# Patient Record
Sex: MALE | Employment: FULL TIME | URBAN - NONMETROPOLITAN AREA
[De-identification: names, ages, dates, MRNs, and addresses within clinical notes are randomized per-mention and may not be internally consistent; named-entity substitution may affect disease eponyms.]

---

## 2017-04-21 ENCOUNTER — OFFICE VISIT (OUTPATIENT)
Dept: MEDICAL GROUP | Facility: PHYSICIAN GROUP | Age: 47
End: 2017-04-21
Payer: COMMERCIAL

## 2017-04-21 VITALS
HEIGHT: 68 IN | WEIGHT: 263 LBS | BODY MASS INDEX: 39.86 KG/M2 | HEART RATE: 111 BPM | SYSTOLIC BLOOD PRESSURE: 152 MMHG | DIASTOLIC BLOOD PRESSURE: 102 MMHG | OXYGEN SATURATION: 93 % | TEMPERATURE: 98.2 F | RESPIRATION RATE: 16 BRPM

## 2017-04-21 DIAGNOSIS — J30.2 SEASONAL ALLERGIC RHINITIS, UNSPECIFIED ALLERGIC RHINITIS TRIGGER: ICD-10-CM

## 2017-04-21 DIAGNOSIS — Z23 NEED FOR TDAP VACCINATION: ICD-10-CM

## 2017-04-21 DIAGNOSIS — Z13.6 SCREENING FOR CARDIOVASCULAR CONDITION: ICD-10-CM

## 2017-04-21 DIAGNOSIS — E66.9 OBESITY (BMI 35.0-39.9 WITHOUT COMORBIDITY): ICD-10-CM

## 2017-04-21 DIAGNOSIS — R09.81 NASAL CONGESTION: ICD-10-CM

## 2017-04-21 DIAGNOSIS — I10 ESSENTIAL HYPERTENSION: ICD-10-CM

## 2017-04-21 DIAGNOSIS — Z00.00 HEALTH CARE MAINTENANCE: ICD-10-CM

## 2017-04-21 PROCEDURE — 90471 IMMUNIZATION ADMIN: CPT | Performed by: NURSE PRACTITIONER

## 2017-04-21 PROCEDURE — 99214 OFFICE O/P EST MOD 30 MIN: CPT | Mod: 25 | Performed by: NURSE PRACTITIONER

## 2017-04-21 PROCEDURE — 90715 TDAP VACCINE 7 YRS/> IM: CPT | Performed by: NURSE PRACTITIONER

## 2017-04-21 RX ORDER — LOSARTAN POTASSIUM AND HYDROCHLOROTHIAZIDE 12.5; 5 MG/1; MG/1
1 TABLET ORAL DAILY
Qty: 90 TAB | Refills: 1 | Status: SHIPPED | OUTPATIENT
Start: 2017-04-21 | End: 2018-04-23 | Stop reason: SDUPTHER

## 2017-04-21 RX ORDER — CETIRIZINE HYDROCHLORIDE 10 MG/1
10 TABLET ORAL DAILY
Qty: 60 TAB | Refills: 5 | Status: SHIPPED | OUTPATIENT
Start: 2017-04-21

## 2017-04-21 RX ORDER — FLUTICASONE PROPIONATE 50 MCG
1 SPRAY, SUSPENSION (ML) NASAL 2 TIMES DAILY
Qty: 1 BOTTLE | Refills: 11 | Status: SHIPPED | OUTPATIENT
Start: 2017-04-21

## 2017-04-21 ASSESSMENT — PATIENT HEALTH QUESTIONNAIRE - PHQ9: CLINICAL INTERPRETATION OF PHQ2 SCORE: 0

## 2017-04-21 NOTE — PROGRESS NOTES
Chief Complaint   Patient presents with   • Establish Care     cough, congestion, discuss BP         This is a 46 y.o.male patient that presents today with the following: Establish care with new PCP, acute and chronic conditions, medication refills    Essential hypertension  Pt reports that this is a chronic condition and is currently not treated as he is out of medication. He was seen in August 2016 in the urgent care and started on losartan 50 mg daily. He ran out and has not taken it since September 2016. His blood pressure today is 152/102. He does deny symptoms of hypertension. When he took the losartan he did tolerate the medication well with no significant or bothersome side effects. He is due for labs, these have been ordered. I would like him to start taking new blood pressure medication, will start-hydrochlorothiazide, and then have labs done 2 weeks after starting new medication. I did discuss with him the risks, benefits and side effects of medication. He is also to work on his weight by eating a healthier diet and increasing his physical activity.    Nasal congestion  Pt states he has had nasal congestion for about 2 months. Reports increase mucus, especially in the morning. Has a little, productive at times--mucus is light green. He sneezes a lot. He feel a lot of ear pressure. He has a lot of sinus pressure and pain and headache. Denies fever.  Upon examination, patient's lateral TMs are mildly retracted with air-fluid levels in the left TM. His nasal mucosa bilaterally were edematous and erythematous with cobblestoning and oropharynx. Discussed with him that his symptoms are consistent with allergic rhinitis and I would like him to start a regimen including second-generation antihistamine, Zyrtec, 10 mg 1-2 pills daily starting with one pill daily increasing to 2 pills after 1-2 weeks. He is also to use Flonase, one spray to each nostril twice a day as well as nasal saline spray 2 sprays to each  nostril up to 3-4 times a day for congestion and nasal rinsing. He may also consider using a H2 blocker such as Pepcid or Zantac for additional histamine blocking. He is to follow with me in 3 months for follow-up.     Obesity (BMI 35.0-39.9 without comorbidity) (HCC)  This is a chronic condition, uncontrolled. Patient's weight is 263, BMI 39.99. He does understand the effects his obesity have on his health. He understands that he needs to increase his physical activity and decrease his calories in order to lose weight. He will work on this and we will reassess this at his follow-up appointment in July.    Health care maintenance  Patient is due for health maintenance exams including routine fasting labs. These have been ordered. He denies a family history of colon cancer in a first-degree relative, thus he will not need colonoscopy until 50. He also denies a family history of prostate cancer in a first-degree relative, thus he does not need PSA screening. He does need TDaP, this was done today.    Seasonal allergic rhinitis  See additional notes      No visits with results within 1 Month(s) from this visit.  Latest known visit with results is:    Hospital Outpatient Visit on 08/02/2016   Component Date Value   • WBC 08/02/2016 6.1    • RBC 08/02/2016 5.45    • Hemoglobin 08/02/2016 17.0    • Hematocrit 08/02/2016 49.8    • MCV 08/02/2016 91.4    • MCH 08/02/2016 31.2    • MCHC 08/02/2016 34.1    • RDW 08/02/2016 40.3    • Platelet Count 08/02/2016 184    • MPV 08/02/2016 10.9    • Neutrophils-Polys 08/02/2016 68.80    • Lymphocytes 08/02/2016 21.60*   • Monocytes 08/02/2016 7.20    • Eosinophils 08/02/2016 1.50    • Basophils 08/02/2016 0.70    • Immature Granulocytes 08/02/2016 0.20    • Nucleated RBC 08/02/2016 0.00    • Neutrophils (Absolute) 08/02/2016 4.18    • Lymphs (Absolute) 08/02/2016 1.31    • Monos (Absolute) 08/02/2016 0.44    • Eos (Absolute) 08/02/2016 0.09    • Baso (Absolute) 08/02/2016 0.04    •  "Immature Granulocytes (a* 08/02/2016 0.01    • NRBC (Absolute) 08/02/2016 0.00    • Sodium 08/02/2016 139    • Potassium 08/02/2016 4.4    • Chloride 08/02/2016 107    • Co2 08/02/2016 26    • Anion Gap 08/02/2016 6.0    • Glucose 08/02/2016 110*   • Bun 08/02/2016 17    • Creatinine 08/02/2016 0.93    • Calcium 08/02/2016 9.7    • AST(SGOT) 08/02/2016 18    • ALT(SGPT) 08/02/2016 38    • Alkaline Phosphatase 08/02/2016 48    • Total Bilirubin 08/02/2016 0.6    • Albumin 08/02/2016 4.2    • Total Protein 08/02/2016 7.1    • Globulin 08/02/2016 2.9    • A-G Ratio 08/02/2016 1.4    • GFR If  08/02/2016 >60    • GFR If Non  Ameri* 08/02/2016 >60          clinical course has been stable    Past Medical History   Diagnosis Date   • Hypertension        Past Surgical History   Procedure Laterality Date   • Other       none reported       Family History   Problem Relation Age of Onset   • Diabetes Mother    • Hypertension Mother    • Cancer Maternal Grandmother    • Other Father      \"flesh-eating\" infection       Review of patient's allergies indicates no known allergies.    Current Outpatient Prescriptions Ordered in [facility]   Medication Sig Dispense Refill   • losartan-hydrochlorothiazide (HYZAAR) 50-12.5 MG per tablet Take 1 Tab by mouth every day. 90 Tab 1   • cetirizine (ZYRTEC) 10 MG Tab Take 1 Tab by mouth every day. 60 Tab 5   • fluticasone (FLONASE) 50 MCG/ACT nasal spray Spray 1 Spray in nose 2 times a day. 1 Bottle 11     No current [facility]-ordered facility-administered medications on file.       Constitutional ROS: No unexpected change in weight, No weakness, No unexplained fevers, sweats, or chills  Neck ROS: No lumps or masses, No swollen glands, No significant pain in neck  Pulmonary ROS: Positive per history of present illness  Cardiovascular ROS: No chest pain, No edema, No palpitations, Positive for hypertension, poorly controlled, per history of present illness  Gastrointestinal ROS: No " "abdominal pain, No nausea, vomiting, diarrhea, or constipation, no blood in stool  Musculoskeletal/Extremities ROS: No clubbing, No cyanosis, No pain, redness or swelling on the joints  Neurologic ROS: Normal development, No seizures, No weakness  All other systems reviewed and are within normal limits    Physical exam:  /102 mmHg  Pulse 111  Temp(Src) 36.8 °C (98.2 °F)  Resp 16  Ht 1.727 m (5' 8\")  Wt 119.296 kg (263 lb)  BMI 40.00 kg/m2  SpO2 93%  General Appearance: Young male, alert, no distress, obese, well-groomed  Skin: Skin color, texture, turgor normal. No rashes or lesions.  Eyes: conjunctivae/corneas clear. PERRL, EOM's intact.   Ears: External ears normal. Canals clear. positive findings: R TM - retracted, L TM - retracted with air-fluid levels  Nose/Sinuses: positive findings: mucosa erythematous and swollen  Oropharynx: positive findings: mild oropharyngeal erythema, cobblestoning  Lungs: negative findings: normal respiratory rate and rhythm, lungs clear to auscultation  Heart: negative. RRR without murmur, gallop, or rubs.  No ectopy.  Abdomen: Abdomen soft, non-tender. BS normal. No masses,  No organomegaly  Musculoskeletal: negative  Neurologic: intact, oriented, mood appropriate, judgment intact. Cranial nerves II through XII grossly intact    Medical decision making/discussion: Patient here to establish care with new PCP, discuss acute and chronic conditions and medication refills. He was started on losartan-hydrochlorothiazide 50-12.5 milligrams daily for his hypertension. He is to take this for 2 weeks then have lab work done. For allergies, I will have him take Zyrtec, 10 mg daily. He can increase to 2 pills daily after 1-2 weeks if needed. He is also to start Flonase, one spray to each nostril twice a day. I would also like him to take nasal saline, 2 sprays to each nostril up to 3-4 times a day for congestion and nasal rinsing. He can also consider Pepcid or Zantac for additional " H2 blocking. He will get TD Today. He is to work on his weight by eating healthy diet and increasing physical activity.    Aj was seen today for establish care.    Diagnoses and all orders for this visit:    Essential hypertension  -     losartan-hydrochlorothiazide (HYZAAR) 50-12.5 MG per tablet; Take 1 Tab by mouth every day.    Seasonal allergic rhinitis, unspecified allergic rhinitis trigger  -     cetirizine (ZYRTEC) 10 MG Tab; Take 1 Tab by mouth every day.  -     fluticasone (FLONASE) 50 MCG/ACT nasal spray; Spray 1 Spray in nose 2 times a day.    Nasal congestion    Obesity (BMI 35.0-39.9 without comorbidity) ([facility])  -     Patient identified as having weight management issue.  Appropriate orders and counseling given.    Health care maintenance    Need for Tdap vaccination  -     TDAP VACCINE =>6YO IM    Screening for cardiovascular condition  -     COMP METABOLIC PANEL; Future  -     LIPID PROFILE; Future          Please note that this dictation was created using voice recognition software. I have made every reasonable attempt to correct obvious errors, but I expect that there are errors of grammar and possibly content that I did not discover before finalizing the note.

## 2017-04-21 NOTE — MR AVS SNAPSHOT
"        Aj Carlisle   2017 2:20 PM   Office Visit   MRN: [id number]    Department:  [facility]   Dept Phone:  [phone]    Description:  Male : 1970   Provider:  MARVA Rendon           Reason for Visit     Establish Care cough, congestion, discuss BP      Allergies as of 2017     No Known Allergies      You were diagnosed with     Essential hypertension   [1140689]       Health care maintenance   [755114]       Need for Tdap vaccination   [914894]       Screening for cardiovascular condition   [906387]       Nasal congestion   [570383]       Obesity (BMI 35.0-39.9 without comorbidity) ([facility])   [006182]       Seasonal allergic rhinitis, unspecified allergic rhinitis trigger   [7351734]         Vital Signs     Blood Pressure Pulse Temperature Respirations Height Weight    152/102 mmHg 111 36.8 °C (98.2 °F) 16 1.727 m (5' 8\") 119.296 kg (263 lb)    Body Mass Index Oxygen Saturation Smoking Status             40.00 kg/m2 93% Never Smoker          Basic Information     Date Of Birth Sex Race Ethnicity Preferred Language    1970 Male Unable to Obtain Unknown English      Problem List              ICD-10-CM Priority Class Noted - Resolved    Essential hypertension I10   2017 - Present    Health care maintenance Z00.00   2017 - Present    Nasal congestion R09.81   2017 - Present    Obesity (BMI 35.0-39.9 without comorbidity) ([facility]) E66.9   2017 - Present      Health Maintenance        Date Due Completion Dates    IMM DTaP/Tdap/Td Vaccine (1 - Tdap) 1989 ---            Current Immunizations     Tdap Vaccine  Incomplete      Below and/or attached are the medications your provider expects you to take. Review all of your home medications and newly ordered medications with your provider and/or pharmacist. Follow medication instructions as directed by your provider and/or pharmacist. Please keep your medication list with you and share with your provider. Update " the information when medications are discontinued, doses are changed, or new medications (including over-the-counter products) are added; and carry medication information at all times in the event of emergency situations     Allergies:  No Known Allergies          Medications  Valid as of: April 21, 2017 -  2:40 PM    Generic Name Brand Name Tablet Size Instructions for use    Cetirizine HCl (Tab) ZYRTEC 10 MG Take 1 Tab by mouth every day.        Fluticasone Propionate (Suspension) FLONASE 50 MCG/ACT Spray 1 Spray in nose 2 times a day.        Losartan Potassium-HCTZ (Tab) HYZAAR 50-12.5 MG Take 1 Tab by mouth every day.        .                 Medicines prescribed today were sent to:     [facility] [address] [place] [address] - [address] [facility]    [address] [facility] [address]    Phone: [phone] Fax: [phone]    Open 24 Hours?: No      Medication refill instructions:       If your prescription bottle indicates you have medication refills left, it is not necessary to call your provider’s office. Please contact your pharmacy and they will refill your medication.    If your prescription bottle indicates you do not have any refills left, you may request refills at any time through one of the following ways: The online [employer] system (except Urgent Care), by calling your provider’s office, or by asking your pharmacy to contact your provider’s office with a refill request. Medication refills are processed only during regular business hours and may not be available until the next business day. Your provider may request additional information or to have a follow-up visit with you prior to refilling your medication.   *Please Note: Medication refills are assigned a new Rx number when refilled electronically. Your pharmacy may indicate that no refills were authorized even though a new prescription for the same medication is available at the pharmacy. Please request the medicine by name with the  pharmacy before contacting your provider for a refill.        Your To Do List     Future Labs/Procedures Complete By Expires    COMP METABOLIC PANEL  As directed 4/21/2018    LIPID PROFILE  As directed 4/21/2018      Instructions    Start new BP medication--take for at least 2 weeks before having your labs done.    Labs are fasting--nothing to eat or drink from 10 the night before    Allergy regimen: Zyrtec 10 mg 1-2 pills daily, start with 1 daily, then can go to 2 pills daily after 1-2 weeks. Flonase 1 spray to each nostril twice a day. Nasal saline 2 sprays to each nostril 3-4 times a days.    Follow up with me in 3 months                Dittit Access Code: [email or web address]  Expires: 5/21/2017  2:04 PM    Dittit  A secure, online tool to manage your health information     [employer] Dittit® is a secure, online tool that connects you to your personalized health information from the privacy of your home -- day or night - making it very easy for you to manage your healthcare. Once the activation process is completed, you can even access your medical information using the Dittit skip, which is available for free in the [employer] Skip store or Google Play store.     Dittit provides the following levels of access (as shown below):   My Chart Features   [facility] Doctor [facility]  Specialists [facility]  Urgent  Care Non-[facility]  Primary Care  Doctor   Email your healthcare team securely and privately 24/7 X X X    Manage appointments: schedule your next appointment; view details of past/upcoming appointments X      Request prescription refills. X      View recent personal medical records, including lab and immunizations X X X X   View health record, including health history, allergies, medications X X X X   Read reports about your outpatient visits, procedures, consult and ER notes X X X X   See your discharge summary, which is a recap of your hospital and/or ER visit that includes your diagnosis, lab results,  and care plan. X X       How to register for [employer]:  1. Go to  [email or web address].  2. Click on the Sign Up Now box, which takes you to the New Member Sign Up page. You will need to provide the following information:  a. Enter your [employer] Access Code exactly as it appears at the top of this page. (You will not need to use this code after you’ve completed the sign-up process. If you do not sign up before the expiration date, you must request a new code.)   b. Enter your date of birth.   c. Enter your home email address.   d. Click Submit, and follow the next screen’s instructions.  3. Create a [employer] ID. This will be your [employer] login ID and cannot be changed, so think of one that is secure and easy to remember.  4. Create a [employer] password. You can change your password at any time.  5. Enter your Password Reset Question and Answer. This can be used at a later time if you forget your password.   6. Enter your e-mail address. This allows you to receive e-mail notifications when new information is available in [employer].  7. Click Sign Up. You can now view your health information.    For assistance activating your [employer] account, call [phone]

## 2017-04-21 NOTE — ASSESSMENT & PLAN NOTE
Pt states he has had nasal congestion for about 2 months. Reports increase mucus, especially in the morning. Has a little, productive at times--mucus is light green. He sneezes a lot. He feel a lot of ear pressure. He has a lot of sinus pressure and pain and headache. Denies fever.  Upon examination, patient's lateral TMs are mildly retracted with air-fluid levels in the left TM. His nasal mucosa bilaterally were edematous and erythematous with cobblestoning and oropharynx. Discussed with him that his symptoms are consistent with allergic rhinitis and I would like him to start a regimen including second-generation antihistamine, Zyrtec, 10 mg 1-2 pills daily starting with one pill daily increasing to 2 pills after 1-2 weeks. He is also to use Flonase, one spray to each nostril twice a day as well as nasal saline spray 2 sprays to each nostril up to 3-4 times a day for congestion and nasal rinsing. He may also consider using a H2 blocker such as Pepcid or Zantac for additional histamine blocking. He is to follow with me in 3 months for follow-up.

## 2017-04-21 NOTE — ASSESSMENT & PLAN NOTE
This is a chronic condition, uncontrolled. Patient's weight is 263, BMI 39.99. He does understand the effects his obesity have on his health. He understands that he needs to increase his physical activity and decrease his calories in order to lose weight. He will work on this and we will reassess this at his follow-up appointment in July.

## 2017-04-21 NOTE — PATIENT INSTRUCTIONS
Start new BP medication--take for at least 2 weeks before having your labs done.    Labs are fasting--nothing to eat or drink from 10 the night before    Allergy regimen: Zyrtec 10 mg 1-2 pills daily, start with 1 daily, then can go to 2 pills daily after 1-2 weeks. Flonase 1 spray to each nostril twice a day. Nasal saline 2 sprays to each nostril 3-4 times a days.    Follow up with me in 3 months

## 2017-04-21 NOTE — ASSESSMENT & PLAN NOTE
Patient is due for health maintenance exams including routine fasting labs. These have been ordered. He denies a family history of colon cancer in a first-degree relative, thus he will not need colonoscopy until 50. He also denies a family history of prostate cancer in a first-degree relative, thus he does not need PSA screening. He does need TDaP, this was done today.

## 2017-04-21 NOTE — ASSESSMENT & PLAN NOTE
Pt reports that this is a chronic condition and is currently not treated as he is out of medication. He was seen in August 2016 in the urgent care and started on losartan 50 mg daily. He ran out and has not taken it since September 2016. His blood pressure today is 152/102. He does deny symptoms of hypertension. When he took the losartan he did tolerate the medication well with no significant or bothersome side effects. He is due for labs, these have been ordered. I would like him to start taking new blood pressure medication, will start-hydrochlorothiazide, and then have labs done 2 weeks after starting new medication. I did discuss with him the risks, benefits and side effects of medication. He is also to work on his weight by eating a healthier diet and increasing his physical activity.

## 2017-04-27 ENCOUNTER — OFFICE VISIT (OUTPATIENT)
Dept: URGENT CARE | Facility: PHYSICIAN GROUP | Age: 47
End: 2017-04-27
Payer: COMMERCIAL

## 2017-04-27 VITALS
HEART RATE: 95 BPM | WEIGHT: 250 LBS | BODY MASS INDEX: 38.02 KG/M2 | SYSTOLIC BLOOD PRESSURE: 130 MMHG | TEMPERATURE: 97.7 F | DIASTOLIC BLOOD PRESSURE: 88 MMHG | RESPIRATION RATE: 16 BRPM | OXYGEN SATURATION: 95 %

## 2017-04-27 DIAGNOSIS — H10.023 PINK EYE, BILATERAL: ICD-10-CM

## 2017-04-27 PROCEDURE — 99214 OFFICE O/P EST MOD 30 MIN: CPT | Performed by: FAMILY MEDICINE

## 2017-04-27 RX ORDER — SULFACETAMIDE SODIUM 100 MG/ML
1 SOLUTION/ DROPS OPHTHALMIC
Qty: 1 BOTTLE | Refills: 0 | Status: SHIPPED | OUTPATIENT
Start: 2017-04-27

## 2017-04-27 NOTE — MR AVS SNAPSHOT
Aj Carlisle   2017 6:15 PM   Office Visit   MRN: [id number]    Department:  [facility]   Dept Phone:  [phone]    Description:  Male : 1970   Provider:  Clarke Carpenter M.D.           Reason for Visit     Red Eye both eyes with redness/drainage x2 days      Allergies as of 2017     No Known Allergies      You were diagnosed with     Pink eye, bilateral   [[id number]]         Vital Signs     Blood Pressure Pulse Temperature Respirations Weight Oxygen Saturation    130/88 mmHg 95 36.5 °C (97.7 °F) 16 113.399 kg (250 lb) 95%    Smoking Status                   Never Smoker            Basic Information     Date Of Birth Sex Race Ethnicity Preferred Language    1970 Male Unable to Obtain Unknown English      Your appointments     May 06, 2017  7:30 AM   Adult Draw/Collection with [facility] (--)    [address] [place] [address] [place] [address]   [phone]            2017  3:40 PM   Established Patient with MARVA Rendon   [facility] [place] ([place])    [address] [place] [address]  [place] [id number] [phone]           You will be receiving a confirmation call a few days before your appointment from our automated call confirmation system.              Problem List              ICD-10-CM Priority Class Noted - Resolved    Essential hypertension I10   2017 - Present    Health care maintenance Z00.00   2017 - Present    Nasal congestion R09.81   2017 - Present    Obesity (BMI 35.0-39.9 without comorbidity) ([facility]) E66.9   2017 - Present    Seasonal allergic rhinitis J30.2   2017 - Present      Health Maintenance        Date Due Completion Dates    IMM DTaP/Tdap/Td Vaccine (2 - Td) 2027            Current Immunizations     Tdap Vaccine 2017      Below and/or attached are the medications your provider expects you to take. Review all of your home medications and newly ordered medications with  your provider and/or pharmacist. Follow medication instructions as directed by your provider and/or pharmacist. Please keep your medication list with you and share with your provider. Update the information when medications are discontinued, doses are changed, or new medications (including over-the-counter products) are added; and carry medication information at all times in the event of emergency situations     Allergies:  No Known Allergies          Medications  Valid as of: April 27, 2017 -  6:55 PM    Generic Name Brand Name Tablet Size Instructions for use    Cetirizine HCl (Tab) ZYRTEC 10 MG Take 1 Tab by mouth every day.        Fluticasone Propionate (Suspension) FLONASE 50 MCG/ACT Spray 1 Spray in nose 2 times a day.        Losartan Potassium-HCTZ (Tab) HYZAAR 50-12.5 MG Take 1 Tab by mouth every day.        Sulfacetamide Sodium (Solution) SULAMYD 10 % Place 1 Drop in both eyes every 3 hours.        .                 Medicines prescribed today were sent to:     [facility] [address] [place] [address] [address] [address]    Phone: [phone] Fax: [phone]    Open 24 Hours?: No    [facility] [address] [place] [address] [address] [facility]    [address] [facility] [address]    Phone: [phone] Fax: [phone]    Open 24 Hours?: No      Medication refill instructions:       If your prescription bottle indicates you have medication refills left, it is not necessary to call your provider’s office. Please contact your pharmacy and they will refill your medication.    If your prescription bottle indicates you do not have any refills left, you may request refills at any time through one of the following ways: The online [employer] system (except Urgent Care), by calling your provider’s office, or by asking your pharmacy to contact your provider’s office with a refill request. Medication refills are processed only during regular business hours and may  not be available until the next business day. Your provider may request additional information or to have a follow-up visit with you prior to refilling your medication.   *Please Note: Medication refills are assigned a new Rx number when refilled electronically. Your pharmacy may indicate that no refills were authorized even though a new prescription for the same medication is available at the pharmacy. Please request the medicine by name with the pharmacy before contacting your provider for a refill.           mAPPn Access Code: [email or web address]  Expires: 5/21/2017  2:04 PM    mAPPn  A secure, online tool to manage your health information     [employer] mAPPn® is a secure, online tool that connects you to your personalized health information from the privacy of your home -- day or night - making it very easy for you to manage your healthcare. Once the activation process is completed, you can even access your medical information using the mAPPn skip, which is available for free in the Apple Skip store or Google Play store.     mAPPn provides the following levels of access (as shown below):   My Chart Features   [facility]  Specialists [facility]  Urgent  Care [facility]  Primary Care  Doctor   Email your healthcare team securely and privately 24/7 X X X    Manage appointments: schedule your next appointment; view details of past/upcoming appointments X      Request prescription refills. X      View recent personal medical records, including lab and immunizations X X X X   View health record, including health history, allergies, medications X X X X   Read reports about your outpatient visits, procedures, consult and ER notes X X X X   See your discharge summary, which is a recap of your hospital and/or ER visit that includes your diagnosis, lab results, and care plan. X X       How to register for mAPPn:  1. Go to  [email or web address].  2. Click on the Sign Up Now box, which  takes you to the New Member Sign Up page. You will need to provide the following information:  a. Enter your [employer] Access Code exactly as it appears at the top of this page. (You will not need to use this code after you’ve completed the sign-up process. If you do not sign up before the expiration date, you must request a new code.)   b. Enter your date of birth.   c. Enter your home email address.   d. Click Submit, and follow the next screen’s instructions.  3. Create a [employer] ID. This will be your [employer] login ID and cannot be changed, so think of one that is secure and easy to remember.  4. Create a [employer] password. You can change your password at any time.  5. Enter your Password Reset Question and Answer. This can be used at a later time if you forget your password.   6. Enter your e-mail address. This allows you to receive e-mail notifications when new information is available in [employer].  7. Click Sign Up. You can now view your health information.    For assistance activating your [employer] account, call [phone]

## 2017-04-28 NOTE — PROGRESS NOTES
"    Chief Complaint   Patient presents with   • Red Eye     both eyes with redness/drainage x2 days           CC:  here for \"pink eye\"      Patient comes in complaining of bilateral eye discharge for 2 d.   C/o white to yellow discharge from the eye.   reports no eye pain, just some itchiness as well as irritation.  visual acuity is unchanged.   has no concurrent fever, chills, cough or upper airway congestion. No sinus pain or pressure.   has not tried anything for this. Nothing seems to make it better or worse.          Social History   Substance Use Topics   • Smoking status: Never Smoker    • Smokeless tobacco: Never Used   • Alcohol Use: 0.0 oz/week     0 Standard drinks or equivalent per week      Comment: once a week when bowling         Social - currently in school.   Questionable sick contacts    Current Outpatient Prescriptions on File Prior to Visit   Medication Sig Dispense Refill   • losartan-hydrochlorothiazide (HYZAAR) 50-12.5 MG per tablet Take 1 Tab by mouth every day. 90 Tab 1   • cetirizine (ZYRTEC) 10 MG Tab Take 1 Tab by mouth every day. 60 Tab 5   • fluticasone (FLONASE) 50 MCG/ACT nasal spray Spray 1 Spray in nose 2 times a day. 1 Bottle 11     No current facility-administered medications on file prior to visit.           Past Medical History   Diagnosis Date   • Hypertension              ROS  Denies cough, chills or abdominal pain or dysuria.    Objective:    Blood pressure 130/88, pulse 95, temperature 36.5 °C (97.7 °F), resp. rate 16, weight 113.399 kg (250 lb), SpO2 95 %.    EXAM      HEENT - PERRLA, EOMI.  There is bilateral conjunctival injection and discharge.  No posterior pharyngeal erythema or exudates  No oral cavity lesions  Ears - TMs both clear.     Neuro - alert and oriented x3. CN 2-12 grossly intact.  Lungs - CTA. No wheezes, rhonchi or rales.  Heart - regular rate and rhythm without murmur.  Musculoskeletal - No lower extremity edema noted.     Psych - behavior " normal    assessment & plan    1. Bacterial conjunctivitis     - sulfacetamide (SULAMYD) 10 % Solution; Place 1 Drop in both eyes every 3 hours.  Dispense: 1 Bottle; Refill: 0

## 2017-11-28 ENCOUNTER — APPOINTMENT (OUTPATIENT)
Dept: RADIOLOGY | Facility: IMAGING CENTER | Age: 47
End: 2017-11-28
Attending: EMERGENCY MEDICINE
Payer: COMMERCIAL

## 2017-11-28 ENCOUNTER — OCCUPATIONAL MEDICINE (OUTPATIENT)
Dept: URGENT CARE | Facility: CLINIC | Age: 47
End: 2017-11-28
Payer: COMMERCIAL

## 2017-11-28 VITALS
OXYGEN SATURATION: 96 % | HEART RATE: 78 BPM | DIASTOLIC BLOOD PRESSURE: 84 MMHG | RESPIRATION RATE: 14 BRPM | SYSTOLIC BLOOD PRESSURE: 132 MMHG | TEMPERATURE: 97.8 F

## 2017-11-28 DIAGNOSIS — S69.91XA HAND INJURY, RIGHT, INITIAL ENCOUNTER: ICD-10-CM

## 2017-11-28 DIAGNOSIS — Z02.1 PRE-EMPLOYMENT DRUG SCREENING: ICD-10-CM

## 2017-11-28 DIAGNOSIS — S63.641A SPRAIN OF METACARPOPHALANGEAL (MCP) JOINT OF RIGHT THUMB, INITIAL ENCOUNTER: ICD-10-CM

## 2017-11-28 LAB
AMP AMPHETAMINE: NORMAL
BREATH ALCOHOL COMMENT: NORMAL
COC COCAINE: NORMAL
INT CON NEG: NORMAL
INT CON POS: NORMAL
MET METHAMPHETAMINES: NORMAL
OPI OPIATES: NORMAL
PCP PHENCYCLIDINE: NORMAL
POC BREATHALIZER: 0 PERCENT (ref 0–0.01)
POC DRUG COMMENT 753798-OCCUPATIONAL HEALTH: NEGATIVE
THC: NORMAL

## 2017-11-28 PROCEDURE — 80305 DRUG TEST PRSMV DIR OPT OBS: CPT | Performed by: EMERGENCY MEDICINE

## 2017-11-28 PROCEDURE — 82075 ASSAY OF BREATH ETHANOL: CPT | Performed by: EMERGENCY MEDICINE

## 2017-11-28 PROCEDURE — 73130 X-RAY EXAM OF HAND: CPT | Mod: TC,RT | Performed by: EMERGENCY MEDICINE

## 2017-11-28 PROCEDURE — 99202 OFFICE O/P NEW SF 15 MIN: CPT | Performed by: EMERGENCY MEDICINE

## 2017-11-28 ASSESSMENT — ENCOUNTER SYMPTOMS
TINGLING: 0
FOCAL WEAKNESS: 0
SENSORY CHANGE: 0

## 2017-11-28 NOTE — LETTER
EMPLOYEE’S CLAIM FOR COMPENSATION/ REPORT OF INITIAL TREATMENT  FORM C-4    EMPLOYEE’S CLAIM - PROVIDE ALL INFORMATION REQUESTED   First Name  Aj Last Name  Orestes Birthdate                    1970                Sex  male Claim Number   Home Address  [address] Age  47 y.o. 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 (if applicable)  Ice finger     How did this injury or occupational disease occur? (Be specific an answer in detail. Use additional sheet if necessary)  Duct kaitlyn telescope slid down smashing right thumb    If you believe that you have an occupational disease, when did you first have knowledge of the disability and it relationship to your employment?  N/A  Witnesses to the Accident  my crew       Nature of Injury or Occupational Disease  Defer  Part(s) of Body Injured or Affected  Thumb (R), Hand (R), Defer    I certify that the above is true and correct to the best of my knowledge and that I have provided this information in order to obtain the benefits of [place] or  [place]).  I hereby authorize any physician, chiropractor, surgeon, practitioner, or other person, any hospital, including [facility] or [facility] hospital, any medical service organization, any insurance company, or other institution or organization to release to each other, any medical or other information, including benefits paid or payable, pertinent to this injury or disease, except information relative to diagnosis, treatment and/or counseling for AIDS, psychological conditions, alcohol or controlled substances, for which I must give specific authorization.  A Photostat of this authorization shall be as valid as the original.     Date   Place   Employee’s Signature   THIS REPORT MUST BE COMPLETED AND MAILED WITHIN 3 WORKING DAYS OF TREATMENT   Place  [facility]  Name of Facility  [facility]   Date  11/28/2017 Diagnosis(S63.641A) Sprain of metacarpophalangeal (MCP) joint of right thumb, initial encounter  (S69.91XA) Hand injury, right, initial encounter   Is there evidence the injured employee was under the influence of alcohol and/or another controlled substance at the time of accident?   Hour  9:29 AM Description of Injury or Disease  Diagnoses of Sprain of metacarpophalangeal (MCP) joint of right thumb, initial encounter, Hand injury, right, initial encounter No   Treatment  Elevation, ice, OTC analgesia. Right thumb spica splint.  Have you advised the patient to remain off work five days or more? No   X-Ray Findings  Negative   If Yes   From Date  To Date      From information given by the employee, together with medical evidence, can you directly connect this injury or occupational disease as job incurred?  Yes If No Full Duty  No Modified Duty  Yes   Is additional medical care by a physician indicated?  Yes If Modified Duty, Specify any Limitations / Restrictions  Must wear right thumb spica splint, 25 pound weight lifting restriction.   Do you know of any previous  "injury or disease contributing to this condition or occupational disease?                            No   Date  11/28/2017 Print Doctor’s Name Brennen Sauceda M.D. I certify the employer’s copy of  this form was mailed on:   Address  [address] Insurer’s Use Only     [facility] Zip  [address]    Provider’s Tax ID Number  [id number] Telephone  Dept: [phone]        andres-BRENNEN Marie M.D.   e-Signature: Dr. Talon Macdonald, Medical Director Degree  MD        ORIGINAL-TREATING PHYSICIAN OR CHIROPRACTOR    PAGE 2-INSURER/TPA    PAGE 3-EMPLOYER    PAGE 4-EMPLOYEE             Form C-4 (rev10/07)              BRIEF DESCRIPTION OF RIGHTS AND BENEFITS  (Pursuant to [place]RS 616C.050)    Notice of Injury or Occupational Disease (Incident Report Form C-1): If an injury or occupational disease (OD) arises out of and in the  course of employment, you must provide written notice to your employer as soon as practicable, but no later than 7 days after the accident or  OD. Your employer shall maintain a sufficient supply of the required forms.    Claim for Compensation (Form C-4): If medical treatment is sought, the form C-4 is available at the place of initial treatment. A completed  \"Claim for Compensation\" (Form C-4) must be filed within 90 days after an accident or OD. The treating physician or chiropractor must,  within 3 working days after treatment, complete and mail to the employer, the employer's insurer and third-party , the Claim for  Compensation.    Medical Treatment: If you require medical treatment for your on-the-job injury or OD, you may be required to select a physician or  chiropractor from a list provided by your workers’ compensation insurer, if it has contracted with an Organization for Managed Care (MCO) or  Preferred Provider Organization (PPO) or providers of health care. If your employer has not entered into a contract with an MCO or PPO, you  may select a physician or " chiropractor from the Panel of Physicians and Chiropractors. Any medical costs related to your industrial injury or  OD will be paid by your insurer.    Temporary Total Disability (TTD): If your doctor has certified that you are unable to work for a period of at least 5 consecutive days, or 5  cumulative days in a 20-day period, or places restrictions on you that your employer does not accommodate, you may be entitled to TTD  compensation.    Temporary Partial Disability (TPD): If the wage you receive upon reemployment is less than the compensation for TTD to which you are  entitled, the insurer may be required to pay you TPD compensation to make up the difference. TPD can only be paid for a maximum of 24  months.    Permanent Partial Disability (PPD): When your medical condition is stable and there is an indication of a PPD as a result of your injury or  OD, within 30 days, your insurer must arrange for an evaluation by a rating physician or chiropractor to determine the degree of your PPD. The  amount of your PPD award depends on the date of injury, the results of the PPD evaluation and your age and wage.    Permanent Total Disability (PTD): If you are medically certified by a treating physician or chiropractor as permanently and totally disabled  and have been granted a PTD status by your insurer, you are entitled to receive monthly benefits not to exceed 66 2/3% of your average  monthly wage. The amount of your PTD payments is subject to reduction if you previously received a PPD award.    Vocational Rehabilitation Services: You may be eligible for vocational rehabilitation services if you are unable to return to the job due to a  permanent physical impairment or permanent restrictions as a result of your injury or occupational disease.    Transportation and Per Hayes Reimbursement: You may be eligible for travel expenses and per hayes associated with medical treatment.    Reopening: You may be able to reopen your  claim if your condition worsens after claim closure.    Appeal Process: If you disagree with a written determination issued by the insurer or the insurer does not respond to your request, you may  appeal to the Department of Administration, , by following the instructions contained in your determination letter. You must  appeal the determination within 70 days from the date of the determination letter at [address], [address], [place], [place]  [address], or [address], [place], [place] [address]. If you disagree with the  decision, you may appeal to the  Department of Administration, . You must file your appeal within 30 days from the date of the  decision  letter at [address], [address], [place], [place] [address], or [address], [place], [place] [address]. If you  disagree with a decision of an , you may file a petition for judicial review with the District Court. You must do so within 30  days of the Appeal Officer’s decision. You may be represented by an  at your own expense or you may contact the [facility] for possible  representation.    [place]  for Injured Workers (NAIW): If you disagree with a  decision, you may request that NAIW represent you  without charge at an  Hearing. For information regarding denial of benefits, you may contact the [facility] at: [address], [place] [address], [phone], or [address], [place] [address], [phone]    To File a Complaint with the Division: If you wish to file a complaint with the  of the Division of Industrial Relations (DIR),  please contact the Workers’ Compensation Section, [address], [address], [place], [place] [address], telephone [phone] [phone], or  [address], [place]  [address], telephone [phone].    For assistance with Workers’ Compensation Issues: you may contact the Office of the Governor Consumer Health Assistance, [address] [place] [address], [facility] [address], [place] [address], Toll Free 1-810.818.4485, Web site: http://[employer].[email or web address]., E-mail  [email or web address].                                                                                                                                                                                                                                   __________________________________________________________________                                                                   _________________                Employee Name / Signature                                                                                                                                                       Date                                                                                                                                                                                                     D-2 (rev. 10/07)

## 2017-11-28 NOTE — LETTER
[address] [place] [address]  Phone:  [phone] - Fax:  [phone]   [facility] Progress Report and Disability Certification  Date of Service: 11/28/2017   No Show:  No  Date / Time of Next Visit: 12/1/2017@9:30 AM   Claim Information   Patient Name: Aj Carlisle  Claim Number:     Employer: [employer]  Date of Injury: 11/28/2017     Insurer / TPA: Vini Rodriguez  ID / SSN:     Occupation: Sheet Metal   Diagnosis: Diagnoses of Sprain of metacarpophalangeal (MCP) joint of right thumb, initial encounter, Hand injury, right, initial encounter, and Pre-employment drug screening were pertinent to this visit.    Medical Information   Related to Industrial Injury? Yes    Subjective Complaints:  Date of injury: 11/28/2017. Injured at work: yes; sliding kaitlyn slipped and landed on right hand. Previous injury: none. Second job: none. Outside activity: none. Contributing medical illness: none. Severity: moderate. Prior treatment:  Ice. . Location: right proximal thumb. Radiation: none. Numbness/tingling: none. Focal weakness: none.   Objective Findings: General: Alert, cooperative, in no acute distress. Musculoskeletal: Right hand-mild swelling right MCP thumb region, tenderness ulnar right thumb MCP region. Decreased range of motion secondary to pain, no gross instability. No flexor or extensor function deficit. Skin: Warm, dry, intact. Vascular: Distal capillary refill intact. Neurological: Distal light touch sensation and pressure sensation intact.   Pre-Existing Condition(s):     Assessment:   Initial Visit    Status: Additional Care Required  Permanent Disability:No    Plan: Medication    Diagnostics: X-ray  Comments:No evidence of acute fracture or dislocation.    Comments:       Disability Information   Status: Released to Restricted Duty    From:  11/28/2017  Through: 12/1/2017 Restrictions are: Temporary   Physical Restrictions      Sitting:    Standing:    Stooping:    Bending:      Squatting:    Walking:    Climbing:    Pushing:      Pulling:    Other:    Comments:must wear right thumb spical splint Reaching Above Shoulder (L):   Reaching Above Shoulder (R):       Reaching Below Shoulder (L):    Reaching Below Shoulder (R):      Not to exceed Weight Limits   Carrying(hrs):   Weight Limit(lb): < or = to 25 pounds Lifting(hrs):   Weight  Limit(lb): < or = to 25 pounds   Comments:      Repetitive Actions   Hands: i.e. Fine Manipulations from Grasping:     Feet: i.e. Operating Foot Controls:     Driving / Operate Machinery:     Physician Name: Brennen Sauceda M.D. Physician Signature: BRENNEN Rider M.D. e-Signature: Dr. Talon Macdonald, Medical Director   Clinic Name / Location: [place] [address]  [place] [address] Clinic Phone Number: Dept: [phone]   Appointment Time: 9:15 Am Visit Start Time: 9:29 AM   Check-In Time:  9:12 Am Visit Discharge Time:  10:36 AM   Original-Treating Physician or Chiropractor    Page 2-Insurer/TPA    Page 3-Employer    Page 4-Employee

## 2017-11-28 NOTE — PROGRESS NOTES
Subjective:      Aj Carlisle is a 47 y.o. male who presents with Finger Injury (NEW WC DOI 11/28/17 (R) thumb)      Date of injury: 11/28/2017. Injured at work: yes; sliding kaitlyn slipped and landed on right hand. Previous injury: none. Second job: none. Outside activity: none. Contributing medical illness: none. Severity: moderate. Prior treatment:  Ice. . Location: right proximal thumb. Radiation: none. Numbness/tingling: none. Focal weakness: none.     HPI    Review of Systems   Musculoskeletal:        No pain proximal or distal to the site.   Skin: Negative for rash.   Neurological: Negative for tingling, sensory change and focal weakness.          Objective:     /84   Pulse 78   Temp 36.6 °C (97.8 °F)   Resp 14   SpO2 96%      Physical Exam    General: Alert, cooperative, in no acute distress. Musculoskeletal: Right hand-mild swelling right MCP thumb region, tenderness ulnar right thumb MCP region. Decreased range of motion secondary to pain, no gross instability. No flexor or extensor function deficit. Skin: Warm, dry, intact. Vascular: Distal capillary refill intact. Neurological: Distal light touch sensation and pressure sensation intact.     D39 and C4 forms completed  Assessment/Plan:     1. Sprain of metacarpophalangeal (MCP) joint of right thumb, initial encounter  Right thumb spica splint  Elevation, ice, OTC analgesia.    2. Hand injury, right, initial encounter  - DX-HAND 3+ RIGHT; per radiologist:  No evidence of acute fracture or dislocation.  Mild degenerative changes.    3. Pre-employment drug screening  - POCT 6 Panel Urine Drug Screen  - POCT Breath Alcohol Test

## 2017-12-01 ENCOUNTER — OCCUPATIONAL MEDICINE (OUTPATIENT)
Dept: OCCUPATIONAL MEDICINE | Facility: CLINIC | Age: 47
End: 2017-12-01
Payer: COMMERCIAL

## 2017-12-01 VITALS
TEMPERATURE: 98.1 F | DIASTOLIC BLOOD PRESSURE: 92 MMHG | HEIGHT: 70 IN | SYSTOLIC BLOOD PRESSURE: 134 MMHG | RESPIRATION RATE: 16 BRPM | WEIGHT: 250 LBS | OXYGEN SATURATION: 97 % | HEART RATE: 88 BPM | BODY MASS INDEX: 35.79 KG/M2

## 2017-12-01 DIAGNOSIS — S60.221A CONTUSION OF RIGHT HAND, INITIAL ENCOUNTER: ICD-10-CM

## 2017-12-01 PROCEDURE — 99202 OFFICE O/P NEW SF 15 MIN: CPT | Performed by: PREVENTIVE MEDICINE

## 2017-12-01 ASSESSMENT — ENCOUNTER SYMPTOMS
MYALGIAS: 0
FOCAL WEAKNESS: 0
CHILLS: 0
TINGLING: 0
FEVER: 0
SENSORY CHANGE: 0

## 2017-12-01 ASSESSMENT — PAIN SCALES - GENERAL: PAINLEVEL: NO PAIN

## 2017-12-01 NOTE — PROGRESS NOTES
"Subjective:      Aj Carlisle is a 47 y.o. male who presents with Follow-Up ( DOI 11/28/2017 - R Hand - Better - Pro Room 1)      DOI 11/28/2017: Duct sliding kaitlyn slipped and landed on right hand. Seen in UCx1, XR right hand were negative. Patient states that pain and swelling spread much resolved. He states he has minimal pain when abducting his thumb. Has not required any medications. Referral release.     HPI    Review of Systems   Constitutional: Negative for chills and fever.   Musculoskeletal: Negative for joint pain and myalgias.   Skin: Negative for itching and rash.   Neurological: Negative for tingling, sensory change and focal weakness.       PMH: No pertinent past medical history to this problem  MEDS: Medications were reviewed in Epic  ALLERGIES: No Known Allergies  SOCHX: Works as a  at [employer]  FH: No pertinent family history to this problem     Objective:     /92   Pulse 88   Temp 36.7 °C (98.1 °F)   Resp 16   Ht 1.778 m (5' 10\")   Wt 113.4 kg (250 lb)   SpO2 97%   BMI 35.87 kg/m²      Physical Exam   Constitutional: He is oriented to person, place, and time. He appears well-developed and well-nourished.   Cardiovascular: Normal rate.    Pulmonary/Chest: Effort normal. No respiratory distress.   Neurological: He is alert and oriented to person, place, and time.   Skin: Skin is warm and dry.   Psychiatric: He has a normal mood and affect. Judgment normal.       Right Hand: No gross deformity. Very minimal tenderness palpation first CMC joint. Full range of motion. Strength intact. Sensation intact       Assessment/Plan:     1. Contusion of right hand, initial encounter  Release from care  Full duty  Expect complete resolution of symptoms next 2-3 weeks. If symptoms do not resolve or if symptoms worsen return to clinic      "

## 2017-12-01 NOTE — LETTER
[address],   [address] [place] [address]  Phone:  [phone] - Fax:  [phone]   [facility] Progress Report and Disability Certification  Date of Service: 12/1/2017   No Show:  No  Date / Time of Next Visit:  Release from care   Claim Information   Patient Name: Aj Carlisle  Claim Number:     Employer: [employer]  Date of Injury: 11/28/2017     Insurer / TPA: Vini Rodriguez  ID / SSN:     Occupation: Sheet Metal   Diagnosis: The encounter diagnosis was Contusion of right hand, initial encounter.    Medical Information   Related to Industrial Injury? Yes    Subjective Complaints:  DOI 11/28/2017: Duct sliding kaitlyn slipped and landed on right hand. Seen in UCx1, XR right hand were negative. Patient states that pain and swelling spread much resolved. He states he has minimal pain when abducting his thumb. Has not required any medications. Referral release.   Objective Findings: Right Hand: No gross deformity. Very minimal tenderness palpation first CMC joint. Full range of motion. Strength intact. Sensation intact   Pre-Existing Condition(s):     Assessment:   Condition Improved    Status: Additional Care Required  Permanent Disability:No    Plan:      Diagnostics:      Comments:  Release from care  Full duty  Expect complete resolution of symptoms next 2-3 weeks. If symptoms do not resolve or if symptoms worsen return to clinic    Disability Information   Status: Released to Full Duty    From:  12/1/2017  Through:   Restrictions are:     Physical Restrictions   Sitting:    Standing:    Stooping:    Bending:      Squatting:    Walking:    Climbing:    Pushing:      Pulling:    Other:    Reaching Above Shoulder (L):   Reaching Above Shoulder (R):       Reaching Below Shoulder (L):    Reaching Below Shoulder (R):      Not to exceed Weight Limits   Carrying(hrs):   Weight Limit(lb):   Lifting(hrs):   Weight  Limit(lb):     Comments:       Repetitive Actions   Hands: i.e. Fine Manipulations from Grasping:     Feet: i.e. Operating Foot Controls:     Driving / Operate Machinery:     Physician Name: Abdelrahman Capone D.O. Physician Signature: ABDELRAHMAN Yates D.O. e-Signature: Dr. Talon Macdonald, Medical Director   Clinic Name / Location: [address],   [address]  [place] [address] Clinic Phone Number: Dept: [phone]   Appointment Time: 9:30 Am Visit Start Time: 9:30 AM   Check-In Time:  9:27 Am Visit Discharge Time:     Original-Treating Physician or Chiropractor    Page 2-Insurer/TPA    Page 3-Employer    Page 4-Employee